# Patient Record
Sex: MALE | Race: WHITE | Employment: FULL TIME | URBAN - METROPOLITAN AREA
[De-identification: names, ages, dates, MRNs, and addresses within clinical notes are randomized per-mention and may not be internally consistent; named-entity substitution may affect disease eponyms.]

---

## 2019-09-03 ENCOUNTER — HOSPITAL ENCOUNTER (OUTPATIENT)
Dept: CT IMAGING | Age: 54
Discharge: HOME | End: 2019-09-03
Payer: SELF-PAY

## 2019-09-03 DIAGNOSIS — Z00.00 PREVENTATIVE HEALTH CARE: ICD-10-CM

## 2019-09-03 PROCEDURE — 75571 CT HRT W/O DYE W/CA TEST: CPT

## 2019-09-05 NOTE — CARDIO/PULMONARY
Cardiopulmonary Rehab: I reached this patient by phone and shared his coronary calcium CT score of \"40 \" with him. Education given regarding coronary artery disease and its effects on the cardiovascular system were reviewed. Patient states that he does have a family history of coronary artery disease, that he does not have diabetes, and  states he is not a nicotine user. Patient reports he is not currently requiring cholesterol medication or blood pressure medication. Patient reports being of normal weight, reports making heart healthy diet choices on most days and is regularly physically active. Patient does feel that stress is a risk factor for him. We discussed the potential daily stress management benefits of regular physical activity, more chi, yoga and deep breathing throughout the day. Patient to follow up with primary care physician. Patient requests a copy of this report be mailed to his home address for him to share with his PCP. Understanding verbalized and no further questions at this time.

## 2019-11-08 ENCOUNTER — DOCUMENTATION ONLY (OUTPATIENT)
Dept: CARDIOLOGY CLINIC | Age: 54
End: 2019-11-08

## 2019-11-08 NOTE — PROGRESS NOTES
Left message for patient to call back with his PCP's name so that I can request records for his appointment on Nov.12 with Dr Chet Santoro.

## 2019-11-12 ENCOUNTER — OFFICE VISIT (OUTPATIENT)
Dept: CARDIOLOGY CLINIC | Age: 54
End: 2019-11-12

## 2019-11-12 VITALS
HEIGHT: 68 IN | HEART RATE: 83 BPM | SYSTOLIC BLOOD PRESSURE: 126 MMHG | BODY MASS INDEX: 27.58 KG/M2 | OXYGEN SATURATION: 94 % | DIASTOLIC BLOOD PRESSURE: 80 MMHG | WEIGHT: 182 LBS

## 2019-11-12 DIAGNOSIS — R00.2 PALPITATIONS: ICD-10-CM

## 2019-11-12 DIAGNOSIS — I25.84 CORONARY ARTERY CALCIFICATION: ICD-10-CM

## 2019-11-12 DIAGNOSIS — I25.10 CORONARY ARTERY CALCIFICATION: ICD-10-CM

## 2019-11-12 DIAGNOSIS — R00.0 RAPID HEART BEAT: Primary | ICD-10-CM

## 2019-11-12 NOTE — LETTER
11/13/19 Patient: Braxton Fraser YOB: 1965 Date of Visit: 11/12/2019 Padmini Humphrey MD 
[address] [address] VIA Facsimile: [phone] Dear Padmini Humphrey MD, Thank you for referring Mr. Braxton Fraser to [address] [place] [address] for evaluation. My notes for this consultation are attached. If you have questions, please do not hesitate to call me. I look forward to following your patient along with you.  
 
 
Sincerely, 
 
Tony Echols, DO

## 2019-11-12 NOTE — PROGRESS NOTES
Eder Conte is a 53 y.o. male    Chief Complaint   Patient presents with    New Patient     rapid heart beat    Other     lightheadedness     Patient states palpitations in June and again a few weeks ago    Chest pain No    SOB No    Dizziness No    Swelling No    Refills No    Visit Vitals  /80 (BP 1 Location: Left arm, BP Patient Position: Sitting)   Pulse 83   Ht 5' 8\" (1.727 m)   Wt 182 lb (82.6 kg)   SpO2 94%   BMI 27.67 kg/m²       1. Have you been to the ER, urgent care clinic since your last visit? Hospitalized since your last visit? No    2. Have you seen or consulted any other health care providers outside of the [address] [place] [address] since your last visit? Include any pap smears or colon screening.  No

## 2019-11-13 NOTE — PROGRESS NOTES
[facility] [address], [address] [facility], [address] [place] [address] N[place]    Office [phone],O [phone]           Sonal Alvarez is a 53 y.o. male presents for evaluation of palpitations      Assessment/Recommendations:      ICD-10-CM ICD-9-CM    1. Rapid heart beat R00.0 785.0 AMB POC EKG ROUTINE W/ 12 LEADS, INTER & REP      ECHO ADULT COMPLETE   2. Palpitations R00.2 785.1    3. Coronary artery calcification I25.10 414.00     I25.84 414.4      CAC score of 40, LAD  PSVT- ? AVNRT by hx, very infrequent episodes. One to twice yearly    - echocardiogram  - consider ambulatory heart monitoring if symptoms persist  - obtain lipids from PCP  - needs TSH, if not done on recent physical  - instructed patient to seek urgent medical attention for palpitations to seek diagnosis      Primary Care Physician- Adore Chowdary MD    Follow-up one year    Subjective:  53 y.o. presents for evaluation of palpitations. He reports having two separate episodes of palpitations previously. Once last year and an episode recently. [facility] but symptoms have resolved. Palpitations lasted several hours and resolved with vagal maneuvers. No chest pain, SOB with symptoms. Very active without any angina symptoms. Previously had CT heart score that was 40, involving LAD territory. [facility]      History reviewed. No pertinent past medical history. History reviewed. No pertinent surgical history.       Current Outpatient Medications:     cannabidiol, CBD, extract 100 mg/mL soln, Take  by mouth., Disp: , Rfl:     Allergies no known allergies     Family History   Problem Relation Age of Onset    Arrhythmia Mother     Anxiety Sister        Social History     Tobacco Use    Smoking status: Never Smoker    Smokeless tobacco: Never Used   Substance Use Topics    Alcohol use: Not Currently     Frequency: Never    Drug use: Not on file       Review of Symptoms:  Pertinent Positive:  negative  Pertinent Negative:  No chest pain, dyspnea on exertion, shortness of breath, orthopnea, PND    All Other systems reviewed and are negative for a Comprehensive ROS (10+)    Physical Exam    Blood pressure 126/80, pulse 83, height 5' 8\" (1.727 m), weight 182 lb (82.6 kg), SpO2 94 %. Constitutional:  well-developed and well-nourished. No distress. HENT: Normocephalic. Eyes: No scleral icterus. Neck:  Neck supple. No JVD present. Pulmonary/Chest: Effort normal and breath sounds normal. No respiratory distress, wheezes or rales. Cardiovascular: Normal rate, regular rhythm, S1 S2 . Exam reveals no gallop and no friction rub. No murmur heard. No edema. Extremities:  Normal muscle tone  Abdominal:   No abnormal distension. Neurological:  Moving all extremities, cranial nerves appear grossly intact. Skin: Skin is not cold. Not diaphoretic. No erythema. Psychiatric:  Grossly normal mood and affect. Intact insight. Objective Data:    EC2019- NSR, normal ecg          CT Results (most recent):  Results from Hospital Encounter encounter on 19   CT HEART W/O CONT WITH CALCIUM    Narrative EXAM: CT HEART W/O CONT WITH CALCIUM  Clinical history: Screening exam  INDICATION: preventative health care. Encounter for general adult medical  examination without abnormal findings    COMPARISON: None. TECHNIQUE: Unenhanced multislice helical CT of the heart was performed on a  64-slice multiple detector row CT system ([email or web address]). Quantitative coronary artery  CT calcium scoring was performed using [email or web address] software. No intravenous contrast was  administered. CT dose reduction was achieved through use of a standardized  protocol tailored for this examination and automatic exposure control for dose  modulation.      FINDINGS:  The coronary calcium in each vessel is as follows:    Left main coronary artery: 0  Left anterior descending coronary artery: 40  Left circumflex coronary artery: 0  Right coronary artery: 0  Posterior descending coronary artery: 0    Total calcium score: 40     Calcium score interpretation:  0-0 = No evidence of CAD  1-10 = Minimal evidence of CAD   = Mild evidence of CAD  101-400 = Moderate evidence of CAD  >400 = Extensive evidence of CAD    A score of 40 is at the 60th percentile rank. That means that 40% of the Male at  the ages from 46-50 will have a higher calcium score. Chest CT findings:  The visualized lungs are clear of mass, nodule, airspace  disease or edema. The entire lung fields are not imaged on this examination. No  mediastinal mass or adenopathy is identified. The visualized portions of the  upper abdominal organs are normal on this unenhanced examination. The bones and  soft tissues are within normal limits. Impression IMPRESSION: Total calcium score of 40. Mild evidence of coronary artery disease. The result should be discussed with the patient taking into account other risk  factors such as age, gender, family history, diabetes, smoking or high  cholesterol levels.              Latasha Simpson DO

## 2019-11-27 ENCOUNTER — TELEPHONE (OUTPATIENT)
Dept: CARDIOLOGY CLINIC | Age: 54
End: 2019-11-27

## 2019-12-04 NOTE — TELEPHONE ENCOUNTER
Called patient ID verified [id number] reviewed below results per Dr Keyanna Piper. Please let patient know that their echocardiogram is normal.     Patient verbalized understanding.

## 2021-01-20 ENCOUNTER — OFFICE VISIT (OUTPATIENT)
Dept: CARDIOLOGY CLINIC | Age: 56
End: 2021-01-20
Payer: COMMERCIAL

## 2021-01-20 VITALS
HEIGHT: 68 IN | BODY MASS INDEX: 29.95 KG/M2 | WEIGHT: 197.6 LBS | SYSTOLIC BLOOD PRESSURE: 120 MMHG | HEART RATE: 88 BPM | DIASTOLIC BLOOD PRESSURE: 78 MMHG

## 2021-01-20 DIAGNOSIS — R93.1 AGATSTON CORONARY ARTERY CALCIUM SCORE LESS THAN 100: ICD-10-CM

## 2021-01-20 DIAGNOSIS — Z13.220 LIPID SCREENING: ICD-10-CM

## 2021-01-20 DIAGNOSIS — R00.2 PALPITATIONS: Primary | ICD-10-CM

## 2021-01-20 PROCEDURE — 99213 OFFICE O/P EST LOW 20 MIN: CPT | Performed by: NURSE PRACTITIONER

## 2021-01-20 PROCEDURE — 93000 ELECTROCARDIOGRAM COMPLETE: CPT | Performed by: NURSE PRACTITIONER

## 2021-01-20 RX ORDER — BUSPIRONE HYDROCHLORIDE 15 MG/1
15 TABLET ORAL DAILY
COMMUNITY
Start: 2021-01-15 | End: 2021-04-21 | Stop reason: ALTCHOICE

## 2021-01-20 NOTE — LETTER
1/26/2021 Patient: Riley Bernal YOB: 1965 Date of Visit: 1/20/2021 Alfonzo Wilkes MD 
[address] [address] Via Fax: [phone] Dear Alfonzo Wilkes MD, Thank you for referring Mr. Miki Ingram to [address]  [facility] for evaluation. My notes for this consultation are attached. If you have questions, please do not hesitate to call me. I look forward to following your patient along with you.  
 
 
Sincerely, 
 
Dorian Callaway, NP

## 2021-01-20 NOTE — PROGRESS NOTES
Katie Piña, KERRIE  [address] [place]  [place], [address] [facility]    Office [phone]  Fax [phone]             Santhosh Livingston is a 63 y.o. male presents for evaluation of palpitations    Assessment/Recommendations:      ICD-10-CM ICD-9-CM    1. Palpitations  R00.2 785.1 AMB POC EKG ROUTINE W/ 12 LEADS, INTER & REP      CBC W/O DIFF      METABOLIC PANEL, COMPREHENSIVE      TSH 3RD GENERATION   2. Lipid screening  Z13.220 V77.91 LIPID PANEL   3. Agatston coronary artery calcium score less than 100  R93.1 793.2      Palpitations / tachycardia- infrequent episodes occurring every other month. Recommend alivecor for hm monitoring  - echocardiogram 11/2019 - nml LVEF, No WMAs, mildly dilated LA  - obtain labs - CBC, CMP, and TSH    CAC score of 40, LAD  - not on aspirin - consider starting 81mg/d next visit - discussed briefly today - pt does not like taking meds    - check lipids   - BP normotensive (not on meds) - pt is regularly active without issue    Primary Care Physician- Cleveland Choi MD    Follow-up in 3mo or PRN to review heart rhythms    Subjective:  63 y.o. presents for fu of of palpitations. He reports recurrent episodes recently which occur every other month. Was asymptotic x9-10mo, then in July symptoms started again. In July noted 6-7hr of irregular heart beat and increased HR ranging from 80s-120s. States episodes continue to occur every other month or so. Has hx of anxiety and was recently rx Buspar by PCP. Pt wondered if this was the issue. However, on last episode pt awoke from sleep with palpitations. Was sleeping comfortably and without issue. Doesn't recall nightmare during sleep or stress/anxiety once waking up. Patient denies any chest pain, dyspnea, syncope, orthopnea, edema, or paroxysmal nocturnal dyspnea. Exercises regularly. Previously had CT heart score that was 40, involving LAD territory. Dx with COVID-19 May of 2020. No past medical history on file. No past surgical history on file. Current Outpatient Medications:     busPIRone (BUSPAR) 15 mg tablet, Take 15 mg by mouth daily. , Disp: , Rfl:     No Known Allergies     Family History   Problem Relation Age of Onset    Arrhythmia Mother     Anxiety Sister        Social History     Tobacco Use    Smoking status: Never Smoker    Smokeless tobacco: Never Used   Substance Use Topics    Alcohol use: Not Currently     Frequency: Never    Drug use: Not on file       Review of Symptoms:  (Positive findings above)  Constitutional: Negative for fever, chills, and diaphoresis. Respiratory: Negative for cough, hemoptysis, sputum production, shortness of breath and wheezing. Cardiovascular: Negative for chest pain, orthopnea, leg swelling and PND. Gastrointestinal: Negative for heartburn, nausea, vomiting, blood in stool and melena. Genitourinary: Negative for dysuria and flank pain. Neurological: Negative for focal weakness, seizures, loss of consciousness  Endo/Heme/Allergies: Negative for abnormal bleeding. Psychiatric/Behavioral: Negative for memory loss. Physical Exam    Blood pressure 120/78, pulse 88, height 5' 8\" (1.727 m), weight 197 lb 9.6 oz (89.6 kg). General - well developed well nourished  Neck - JVP normal,   Cardiac - normal S1, S2, no murmurs, rubs or gallops.  No clicks  Vascular - carotids without bruits, radials and pedal pulses equal bilateral  Lungs - clear to auscultation bilaterals, no rales, wheezing or rhonchi  Abd - soft nontender, non-distended, +BS  Extremities - no edema, warm, well perfused  Neuro - nonfocal  Psych - normal mood and affect      Objective Data:    ECG 1/20/21 - NSR    11/12/2019- NSR, normal ecg    CT Results (most recent):  Results from [place] encounter on 09/03/19   CT HEART W/O CONT WITH CALCIUM    Narrative EXAM: CT HEART W/O CONT WITH CALCIUM  Clinical history: Screening exam  INDICATION: preventative health care. Encounter for general adult medical  examination without abnormal findings    COMPARISON: None. TECHNIQUE: Unenhanced multislice helical CT of the heart was performed on a  64-slice multiple detector row CT system (eÃ“ticaT). Quantitative coronary artery  CT calcium scoring was performed using Task Messenger software. No intravenous contrast was  administered. CT dose reduction was achieved through use of a standardized  protocol tailored for this examination and automatic exposure control for dose  modulation. FINDINGS:  The coronary calcium in each vessel is as follows:    Left main coronary artery: 0  Left anterior descending coronary artery: 40  Left circumflex coronary artery: 0  Right coronary artery: 0  Posterior descending coronary artery: 0    Total calcium score: 40     Calcium score interpretation:  0-0 = No evidence of CAD  1-10 = Minimal evidence of CAD   = Mild evidence of CAD  101-400 = Moderate evidence of CAD  >400 = Extensive evidence of CAD    A score of 40 is at the 60th percentile rank. That means that 40% of the Male at  the ages from 46-50 will have a higher calcium score. Chest CT findings:  The visualized lungs are clear of mass, nodule, airspace  disease or edema. The entire lung fields are not imaged on this examination. No  mediastinal mass or adenopathy is identified. The visualized portions of the  upper abdominal organs are normal on this unenhanced examination. The bones and  soft tissues are within normal limits. Impression IMPRESSION: Total calcium score of 40. Mild evidence of coronary artery disease. The result should be discussed with the patient taking into account other risk  factors such as age, gender, family history, diabetes, smoking or high  cholesterol levels.        Javad Penny, ANP

## 2021-01-20 NOTE — PATIENT INSTRUCTIONS
Get blood work after our visit I recommend you buy alivecor for monitoring of your heart rhythm - we will see you back in 3months for follow-up. Https://[employer]. [employer]/products/stalin

## 2021-03-22 ENCOUNTER — OFFICE VISIT (OUTPATIENT)
Dept: CARDIOLOGY CLINIC | Age: 56
End: 2021-03-22
Payer: COMMERCIAL

## 2021-03-22 VITALS
DIASTOLIC BLOOD PRESSURE: 68 MMHG | BODY MASS INDEX: 30.43 KG/M2 | SYSTOLIC BLOOD PRESSURE: 98 MMHG | HEIGHT: 68 IN | RESPIRATION RATE: 18 BRPM | OXYGEN SATURATION: 97 % | WEIGHT: 200.8 LBS | HEART RATE: 85 BPM

## 2021-03-22 DIAGNOSIS — R00.2 PALPITATIONS: ICD-10-CM

## 2021-03-22 DIAGNOSIS — R93.1 AGATSTON CORONARY ARTERY CALCIUM SCORE LESS THAN 100: ICD-10-CM

## 2021-03-22 DIAGNOSIS — R00.0 TACHYCARDIA: Primary | ICD-10-CM

## 2021-03-22 PROCEDURE — 99214 OFFICE O/P EST MOD 30 MIN: CPT | Performed by: NURSE PRACTITIONER

## 2021-03-22 RX ORDER — DILTIAZEM HYDROCHLORIDE 30 MG/1
30 TABLET, FILM COATED ORAL AS NEEDED
Qty: 60 TAB | Refills: 0 | Status: SHIPPED | OUTPATIENT
Start: 2021-03-22

## 2021-03-22 NOTE — PATIENT INSTRUCTIONS
Lets arrange for 30d loop monitor as well as echo at follow-up. See Dr. Whit Osullivan. in about 6 weeks with echo same day. Start aspirin 81mg per day.

## 2021-03-22 NOTE — PROGRESS NOTES
Room #: 9    Still having episode of rapid heartbeats/palpitations and dizziness. Visit Vitals  BP 98/68 (BP 1 Location: Left upper arm, BP Patient Position: Sitting, BP Cuff Size: Large adult)   Pulse 85   Resp 18   Ht 5' 8\" (1.727 m)   Wt 200 lb 12.8 oz (91.1 kg)   SpO2 97%   BMI 30.53 kg/m²         Chest pain:  NO  Shortness of breath:  NO  Edema: NO  Palpitations, skipped beats, rapid heartbeat:  YES  Dizziness:  YES    1. Have you been to the ER, urgent care clinic since your last visit? Hospitalized since your last visit? No    2. Have you seen or consulted any other health care providers outside of the [address] [place] [address] since your last visit? Include any pap smears or colon screening.  No      Refills:  NO

## 2021-03-22 NOTE — LETTER
4/5/2021 Patient: Annie Fay YOB: 1965 Date of Visit: 3/22/2021 David Baca MD 
[address] [address] Via Fax: [phone] Dear David Baca MD, Thank you for referring Mr. Dante Daniel to [address] [facility] for evaluation. My notes for this consultation are attached. If you have questions, please do not hesitate to call me. I look forward to following your patient along with you.  
 
 
Sincerely, 
 
Lalitha Guzmán NP

## 2021-03-22 NOTE — PROGRESS NOTES
Olaf Eddy, KERRIE  [address] [place]  [place], [address] [facility]    Office [phone]  Fax [phone]             Alicja Alexander is a 63 y.o. male presents for f/u of tachycardia/ palpitations. Assessment/Recommendations:      ICD-10-CM ICD-9-CM    1. Tachycardia  R00.0 785.0    2. Palpitations  R00.2 785.1    3. Agatston coronary artery calcium score less than 100  R93.1 793.2       Palpitations / tachycardia- concern for parox afib  - check 30d loop monitor as well as updated echo  - Take diltiazem IR 30mg as needed for HR sustained >100 at rest for >30min  - echocardiogram 11/2019 - nml LVEF, No WMAs, mildly dilated LA  - TSH nml 1/20/21  - [id number] Score 0     CAC score of 40, LAD  - start aspirin 81mg/d   - LDL mildly elevated at 113 - try lifestyle changes for now. ASCVD Risk 4.9%. Re-eval in 6mo; pt prefers to avoid multiple meds if able  - BP normotensive (not on meds)  - pt is regularly active without issue    Primary Care Physician- Antwon Dale MD    Follow-up in 6wks or PRN    Subjective:  63 y.o. presents for fu of of palpitations. He reports recurrent episodes with concern of parox afib/afib with RVR per alivecor monitor. Reviewed by Dr. Krystin Jack in office. Patient denies any chest pain, dyspnea, syncope, orthopnea, edema, or paroxysmal nocturnal dyspnea. Exercises regularly. Previously had CT heart score that was 40, involving LAD territory. No past medical history on file. No past surgical history on file. Current Outpatient Medications:     dilTIAZem IR (CARDIZEM) 30 mg tablet, Take 1 Tab by mouth as needed (take as need for HR sustained >100 at rest for >30min. )., Disp: 60 Tab, Rfl: 0    busPIRone (BUSPAR) 15 mg tablet, Take 15 mg by mouth daily. , Disp: , Rfl:     No Known Allergies     Family History   Problem Relation Age of Onset    Arrhythmia Mother     Anxiety Sister        Social History     Tobacco Use    Smoking status: Never Smoker    Smokeless tobacco: Never Used   Substance Use Topics    Alcohol use: Not Currently     Frequency: Never    Drug use: Not on file       Review of Symptoms:  (Positive findings above)  Constitutional: Negative for fever, chills, and diaphoresis. Respiratory: Negative for cough, hemoptysis, sputum production, shortness of breath and wheezing. Cardiovascular: Negative for chest pain, orthopnea, leg swelling and PND. Gastrointestinal: Negative for heartburn, nausea, vomiting, blood in stool and melena. Genitourinary: Negative for dysuria and flank pain. Neurological: Negative for focal weakness, seizures, loss of consciousness  Endo/Heme/Allergies: Negative for abnormal bleeding. Psychiatric/Behavioral: Negative for memory loss. Physical Exam    Blood pressure 98/68, pulse 85, resp. rate 18, height 5' 8\" (1.727 m), weight 200 lb 12.8 oz (91.1 kg), SpO2 97 %. BP Readings from Last 3 Encounters:   03/22/21 98/68   01/20/21 120/78   11/20/19 112/68     General - well developed well nourished  Neck - JVP normal,   Cardiac - normal S1, S2, no murmurs, rubs or gallops. No clicks  Vascular - radials and pedal pulses equal bilateral  Lungs - clear to auscultation bilaterals, no rales, wheezing or rhonchi  Abd - soft nontender, non-distended, +BS  Extremities - no edema, warm, well perfused  Neuro - nonfocal  Psych - normal mood and affect      Objective Data:    ECG 1/20/21 - NSR    11/12/2019- NSR, normal ecg    CT Results (most recent):  Results from [place] encounter on 09/03/19   CT HEART W/O CONT WITH CALCIUM    Narrative EXAM: CT HEART W/O CONT WITH CALCIUM  Clinical history: Screening exam  INDICATION: preventative health care. Encounter for general adult medical  examination without abnormal findings    COMPARISON: None. TECHNIQUE: Unenhanced multislice helical CT of the heart was performed on a  64-slice multiple detector row CT system (Quikey VCT).  Quantitative coronary artery  CT calcium scoring was performed using Picanova software. No intravenous contrast was  administered. CT dose reduction was achieved through use of a standardized  protocol tailored for this examination and automatic exposure control for dose  modulation. FINDINGS:  The coronary calcium in each vessel is as follows:    Left main coronary artery: 0  Left anterior descending coronary artery: 40  Left circumflex coronary artery: 0  Right coronary artery: 0  Posterior descending coronary artery: 0    Total calcium score: 40     Calcium score interpretation:  0-0 = No evidence of CAD  1-10 = Minimal evidence of CAD   = Mild evidence of CAD  101-400 = Moderate evidence of CAD  >400 = Extensive evidence of CAD    A score of 40 is at the 60th percentile rank. That means that 40% of the Male at  the ages from 46-50 will have a higher calcium score. Chest CT findings:  The visualized lungs are clear of mass, nodule, airspace  disease or edema. The entire lung fields are not imaged on this examination. No  mediastinal mass or adenopathy is identified. The visualized portions of the  upper abdominal organs are normal on this unenhanced examination. The bones and  soft tissues are within normal limits. Impression IMPRESSION: Total calcium score of 40. Mild evidence of coronary artery disease. The result should be discussed with the patient taking into account other risk  factors such as age, gender, family history, diabetes, smoking or high  cholesterol levels.        Pako Hudson, ANP

## 2021-04-21 ENCOUNTER — OFFICE VISIT (OUTPATIENT)
Dept: CARDIOLOGY CLINIC | Age: 56
End: 2021-04-21
Payer: COMMERCIAL

## 2021-04-21 ENCOUNTER — ANCILLARY PROCEDURE (OUTPATIENT)
Dept: CARDIOLOGY CLINIC | Age: 56
End: 2021-04-21

## 2021-04-21 VITALS
DIASTOLIC BLOOD PRESSURE: 68 MMHG | WEIGHT: 199.4 LBS | OXYGEN SATURATION: 97 % | HEIGHT: 68 IN | BODY MASS INDEX: 30.22 KG/M2 | HEART RATE: 90 BPM | SYSTOLIC BLOOD PRESSURE: 120 MMHG

## 2021-04-21 VITALS
WEIGHT: 200 LBS | SYSTOLIC BLOOD PRESSURE: 120 MMHG | HEIGHT: 68 IN | BODY MASS INDEX: 30.31 KG/M2 | DIASTOLIC BLOOD PRESSURE: 68 MMHG

## 2021-04-21 DIAGNOSIS — R00.2 PALPITATIONS: ICD-10-CM

## 2021-04-21 DIAGNOSIS — R93.1 AGATSTON CORONARY ARTERY CALCIUM SCORE LESS THAN 100: ICD-10-CM

## 2021-04-21 DIAGNOSIS — I47.29 NSVT (NONSUSTAINED VENTRICULAR TACHYCARDIA): Primary | ICD-10-CM

## 2021-04-21 DIAGNOSIS — R00.0 TACHYCARDIA: ICD-10-CM

## 2021-04-21 LAB
ECHO AO ASC DIAM: 3.16 CM
ECHO AO ROOT DIAM: 3.36 CM
ECHO AV AREA PEAK VELOCITY: 3.13 CM2
ECHO AV AREA VTI: 2.68 CM2
ECHO AV AREA/BSA PEAK VELOCITY: 1.5 CM2/M2
ECHO AV AREA/BSA VTI: 1.3 CM2/M2
ECHO AV MEAN GRADIENT: 3.43 MMHG
ECHO AV PEAK GRADIENT: 7.03 MMHG
ECHO AV PEAK VELOCITY: 132.57 CM/S
ECHO AV VTI: 23.8 CM
ECHO LA AREA 4C: 22.34 CM2
ECHO LA MAJOR AXIS: 3.49 CM
ECHO LA MINOR AXIS: 1.71 CM
ECHO LA VOL 2C: 69.48 ML (ref 18–58)
ECHO LA VOL 4C: 66.75 ML (ref 18–58)
ECHO LA VOL BP: 73.65 ML (ref 18–58)
ECHO LA VOL/BSA BIPLANE: 36.08 ML/M2 (ref 16–28)
ECHO LA VOLUME INDEX A2C: 34.04 ML/M2 (ref 16–28)
ECHO LA VOLUME INDEX A4C: 32.7 ML/M2 (ref 16–28)
ECHO LV E' LATERAL VELOCITY: 12.62 CM/S
ECHO LV E' SEPTAL VELOCITY: 9.5 CM/S
ECHO LV INTERNAL DIMENSION DIASTOLIC: 5.02 CM (ref 4.2–5.9)
ECHO LV INTERNAL DIMENSION SYSTOLIC: 3.27 CM
ECHO LV IVSD: 0.82 CM (ref 0.6–1)
ECHO LV MASS 2D: 135.6 G (ref 88–224)
ECHO LV MASS INDEX 2D: 66.4 G/M2 (ref 49–115)
ECHO LV POSTERIOR WALL DIASTOLIC: 0.77 CM (ref 0.6–1)
ECHO LVOT DIAM: 2.11 CM
ECHO LVOT PEAK GRADIENT: 5.61 MMHG
ECHO LVOT PEAK VELOCITY: 118.39 CM/S
ECHO LVOT SV: 63.9 ML
ECHO LVOT VTI: 18.23 CM
ECHO MV A VELOCITY: 55.16 CM/S
ECHO MV E DECELERATION TIME (DT): 206.39 MS
ECHO MV E VELOCITY: 58.87 CM/S
ECHO MV E/A RATIO: 1.07
ECHO MV E/E' LATERAL: 4.66
ECHO MV E/E' RATIO (AVERAGED): 5.43
ECHO MV E/E' SEPTAL: 6.2
ECHO MV PRESSURE HALF TIME (PHT): 59.85 MS
ECHO RA AREA 4C: 25.17 CM2
ECHO RV INTERNAL DIMENSION: 3.63 CM
ECHO RV TAPSE: 3.08 CM (ref 1.5–2)
LA VOL DISK BP: 68.21 ML (ref 18–58)

## 2021-04-21 PROCEDURE — 99214 OFFICE O/P EST MOD 30 MIN: CPT | Performed by: STUDENT IN AN ORGANIZED HEALTH CARE EDUCATION/TRAINING PROGRAM

## 2021-04-21 PROCEDURE — 93306 TTE W/DOPPLER COMPLETE: CPT | Performed by: STUDENT IN AN ORGANIZED HEALTH CARE EDUCATION/TRAINING PROGRAM

## 2021-04-21 NOTE — PROGRESS NOTES
Leticia Boykin is a 63 y.o. male    Chief Complaint   Patient presents with    Palpitations    Follow-up     3 month f/u, CAC, rapid heart beat, Echo today     Patient still wearing monitor  Had J & J vaccine and has felt brain fog. Chest pain No     SOB No     Dizziness No    Swelling No     Refills No    Visit Vitals  /68 (BP 1 Location: Left upper arm, BP Patient Position: Sitting)   Pulse 90   Ht 5' 8\" (1.727 m)   Wt 199 lb 6.4 oz (90.4 kg)   SpO2 97%   BMI 30.32 kg/m²       1. Have you been to the ER, urgent care clinic since your last visit? Hospitalized since your last visit? No     2. Have you seen or consulted any other health care providers outside of the [address] [place] [address] since your last visit? Include any pap smears or colon screening.   No

## 2021-04-21 NOTE — PROGRESS NOTES
[address] [facility], [address] [place] [address]    Office [phone]  Fax [phone]             Frankey Barrack is a 63 y.o. male presents for f/u of tachycardia/palpitations. Assessment/Recommendations:      ICD-10-CM ICD-9-CM    1. NSVT (nonsustained ventricular tachycardia) ([facility])  I47.2 427.1 ECHO STRESS   2. Palpitations  R00.2 785.1    3. Agatston coronary artery calcium score less than 100  R93.1 793.2         Palpitations / tachycardia- concern for parox afib, event monitor to date has not shown any A. fib or paroxysmal supraventricular tachycardia. Event monitor 4/6/21, 5 beats of NSVT  -Continue 30 Day Loop  - As needed diltiazem IR 30mg  HR sustained >100 at rest for >30min  - TSH nml 1/20/21  - [id number] Score 0     NSVT5 beats on event monitor for palpitations. Recommend to proceed with exercise stress echo    CAC score of 40, LAD. No anginal chest pain and good functional capacity  - aspirin 81mg/d   - LDL mildly elevated at 113 - try lifestyle changes for now. ASCVD Risk 4.9%. Re-eval in 6mo; pt prefers to avoid multiple meds if possible    Anxietylikely contributing to his palpitation symptoms. Long-term can consider adding beta-blocker therapy, pending clinical course      Primary Care Physician- Bill Colby MD    Follow-up in 6 months sooner as needed        Subjective:  63 y.o. presents the office for follow-up evaluation. Recently seen due to increased palpitations. His alivecor showed concern for possible atrial fibrillation. Currently wearing 30-day event monitor. To date he has not shown any A. fib nor SVT. Incidentally shown to have 5 beats of NSVT on April 6 at 7 AM.  Continues to be active without any ongoing exertional chest pain symptoms. Lifts weights frequently without chest pain symptoms. He does report severe anxiety.         Current Outpatient Medications:     dilTIAZem IR (CARDIZEM) 30 mg tablet, Take 1 Tab by mouth as needed (take as need for HR sustained >100 at rest for >30min. )., Disp: 60 Tab, Rfl: 0    No Known Allergies     Family History   Problem Relation Age of Onset    Arrhythmia Mother     Anxiety Sister        Social History     Tobacco Use    Smoking status: Never Smoker    Smokeless tobacco: Never Used   Substance Use Topics    Alcohol use: Not Currently     Frequency: Never    Drug use: Not on file       Review of Symptoms:  (Positive findings above)  Constitutional: Negative for fever, chills, and diaphoresis. Respiratory: Negative for cough, hemoptysis, sputum production, shortness of breath and wheezing. Cardiovascular: Negative for chest pain, orthopnea, leg swelling and PND. Gastrointestinal: Negative for heartburn, nausea, vomiting, blood in stool and melena. Genitourinary: Negative for dysuria and flank pain. Neurological: Negative for focal weakness, seizures, loss of consciousness  Endo/Heme/Allergies: Negative for abnormal bleeding. Psychiatric/Behavioral: Negative for memory loss. Physical Exam    Blood pressure 120/68, pulse 90, height 5' 8\" (1.727 m), weight 199 lb 6.4 oz (90.4 kg), SpO2 97 %. BP Readings from Last 3 Encounters:   04/21/21 120/68   04/21/21 120/68   03/22/21 98/68     General - well developed well nourished  Neck - JVP normal,   Cardiac - normal S1, S2, no murmurs, rubs or gallops.  No clicks  Vascular - radials and pedal pulses equal bilateral  Lungs - clear to auscultation bilaterals, no rales, wheezing or rhonchi  Abd - soft nontender, non-distended, +BS  Extremities - no edema, warm, well perfused  Neuro - nonfocal  Psych - normal mood and affect      Objective Data:    ECG 1/20/21 - NSR    11/12/2019- NSR, normal ecg    CT heart score 9/2019  Left main coronary artery: 0  Left anterior descending coronary artery: 40  Left circumflex coronary artery: 0  Right coronary artery: 0  Posterior descending coronary artery: 0     Total calcium score: 40       Echo 4/21/2021normal LVEF, no significant valvular pathology

## 2021-05-04 ENCOUNTER — APPOINTMENT (OUTPATIENT)
Dept: CARDIOLOGY CLINIC | Age: 56
End: 2021-05-04

## 2021-05-04 ENCOUNTER — ANCILLARY PROCEDURE (OUTPATIENT)
Dept: CARDIOLOGY CLINIC | Age: 56
End: 2021-05-04
Payer: COMMERCIAL

## 2021-05-04 VITALS — BODY MASS INDEX: 30.16 KG/M2 | WEIGHT: 199 LBS | HEIGHT: 68 IN

## 2021-05-04 DIAGNOSIS — I47.29 NSVT (NONSUSTAINED VENTRICULAR TACHYCARDIA): ICD-10-CM

## 2021-05-04 LAB
STRESS ANGINA INDEX: 0
STRESS BASELINE DIAS BP: 70 MMHG
STRESS BASELINE HR: 84 BPM
STRESS BASELINE SYS BP: 128 MMHG
STRESS ESTIMATED WORKLOAD: 13.4 METS
STRESS EXERCISE DUR MIN: NORMAL
STRESS O2 SAT PEAK: 91 %
STRESS O2 SAT REST: 96 %
STRESS PEAK DIAS BP: 86 MMHG
STRESS PEAK SYS BP: 150 MMHG
STRESS PERCENT HR ACHIEVED: 100 %
STRESS POST PEAK HR: 164 BPM
STRESS RATE PRESSURE PRODUCT: NORMAL BPM*MMHG
STRESS ST DEPRESSION: 0 MM
STRESS ST ELEVATION: 0 MM
STRESS TARGET HR: 164 BPM

## 2021-05-04 PROCEDURE — 93351 STRESS TTE COMPLETE: CPT | Performed by: STUDENT IN AN ORGANIZED HEALTH CARE EDUCATION/TRAINING PROGRAM

## 2021-05-07 ENCOUNTER — DOCUMENTATION ONLY (OUTPATIENT)
Dept: CARDIOLOGY CLINIC | Age: 56
End: 2021-05-07

## 2021-10-28 ENCOUNTER — OFFICE VISIT (OUTPATIENT)
Dept: CARDIOLOGY CLINIC | Age: 56
End: 2021-10-28
Payer: COMMERCIAL

## 2021-10-28 VITALS
HEIGHT: 68 IN | WEIGHT: 195 LBS | OXYGEN SATURATION: 97 % | HEART RATE: 72 BPM | SYSTOLIC BLOOD PRESSURE: 132 MMHG | BODY MASS INDEX: 29.55 KG/M2 | DIASTOLIC BLOOD PRESSURE: 80 MMHG

## 2021-10-28 DIAGNOSIS — R00.2 PALPITATIONS: Primary | ICD-10-CM

## 2021-10-28 DIAGNOSIS — I48.0 PAROXYSMAL ATRIAL FIBRILLATION (HCC): ICD-10-CM

## 2021-10-28 PROCEDURE — 99214 OFFICE O/P EST MOD 30 MIN: CPT | Performed by: STUDENT IN AN ORGANIZED HEALTH CARE EDUCATION/TRAINING PROGRAM

## 2021-10-28 NOTE — PROGRESS NOTES
[address] [facility], [address] [place] [address]    Office [phone]  Fax [phone]             Ty Bruno is a 61 y.o. male presents for f/u of tachycardia/palpitations. Assessment/Recommendations:      ICD-10-CM ICD-9-CM    1. Palpitations  R00.2 785.1    2. Paroxysmal atrial fibrillation (HCC)  I48.0 427.31         Paroxysmal supraventricular tachycardia/probable paroxysmal atrial fibrillation, continues to monitor heart rhythm with Kira Chen. Reviewed rhythm strips from symptomatic event 9/21. Appears to be paroxysmal atrial fibrillation.  - Discussed using diltiazem on a daily versus versus instant release on an as-needed basis, patient to continue as needed diltiazem IR 30mg for sustained tachycardia. Consider AF ablation if patient has escalation in AF symptoms  - TSH nml 1/20/21  - [id number] Score 0       CAC score of 40, LAD. No anginal chest pain and good functional capacity  - LDL mildly elevated at 113 - try lifestyle changes for now. ASCVD Risk 4.9%. Anxiety, depression      Primary Care Physician- Duke Ruiz MD    Follow-up in 6 months sooner as needed        Subjective:  61 y.o. presents the office for follow-up evaluation. Presents to the office for follow-up evaluation. History of having remote palpitations. Reports not having any symptomatic episodes for approximately 9 months. Back in September he developed several hours of palpitations. Alivecore cardia pacing shows paroxysmal atrial fibrillation. He also reports having an episode on Tuesday after a stressful day at work. Symptoms usually last for about several hours. Patient takes diltiazem in the calm down. Current Outpatient Medications:     TURMERIC PO, Take 1,500 mcg by mouth daily. , Disp: , Rfl:     dilTIAZem IR (CARDIZEM) 30 mg tablet, Take 1 Tab by mouth as needed (take as need for HR sustained >100 at rest for >30min. )., Disp: 60 Tab, Rfl: 0    No Known Allergies Family History   Problem Relation Age of Onset    Arrhythmia Mother     Anxiety Sister        Social History     Tobacco Use    Smoking status: Never Smoker    Smokeless tobacco: Never Used   Substance Use Topics    Alcohol use: Not Currently    Drug use: Not on file       Review of Symptoms:  (Positive findings above)  Constitutional: Negative for fever, chills, and diaphoresis. Respiratory: Negative for cough, hemoptysis, sputum production, shortness of breath and wheezing. Cardiovascular: Negative for chest pain, orthopnea, leg swelling and PND. Gastrointestinal: Negative for heartburn, nausea, vomiting, blood in stool and melena. Genitourinary: Negative for dysuria and flank pain. Neurological: Negative for focal weakness, seizures, loss of consciousness  Endo/Heme/Allergies: Negative for abnormal bleeding. Psychiatric/Behavioral: Negative for memory loss. Physical Exam    Blood pressure 132/80, pulse 72, height 5' 8\" (1.727 m), weight 195 lb (88.5 kg), SpO2 97 %. BP Readings from Last 3 Encounters:   10/28/21 132/80   04/21/21 120/68   04/21/21 120/68     General - well developed well nourished  Neck - JVP normal,   Cardiac - normal S1, S2, no murmurs, rubs or gallops.  No clicks  Vascular - radials and pedal pulses equal bilateral  Lungs - clear to auscultation bilaterals, no rales, wheezing or rhonchi  Abd - soft nontender, non-distended, +BS  Extremities - no edema, warm, well perfused  Neuro - nonfocal  Psych - normal mood and affect      Objective Data:    ECG 1/20/21 - NSR    11/12/2019- NSR, normal ecg    CT heart score 9/2019  Left main coronary artery: 0  Left anterior descending coronary artery: 40  Left circumflex coronary artery: 0  Right coronary artery: 0  Posterior descending coronary artery: 0     Total calcium score: 40       Echo 4/21/2021normal LVEF, no significant valvular pathology      Event monitor- 3/31-4/29/21- 5 beat Vtach 4/6/21 at 0700.    05/04/21    ECHO STRESS 05/04/2021 5/25/2021    Interpretation Summary  · Baseline ECG: Normal sinus rhythm. · Stress test: Negative stress test. Low risk Duke treadmill score. · Echo: Negative stress echocardiogram for evidence of ischemia. Low risk duke treadmill score. No ecg changes at good functional capacity. Sensitivity of stress echocardiographic images is limited due to HR <85% of peak on post-exercise images.     Signed by: Earnest Salguero DO on 5/4/2021  4:59 PM

## 2021-10-28 NOTE — PROGRESS NOTES
Abelardo Zavala is a 61 y.o. male    Chief Complaint   Patient presents with    Follow-up     6 month f/u NSVT    Palpitations     Chest pain had a few slight twinges     SOB No     Dizziness No    Swelling No    Refills No    Visit Vitals  /80 (BP 1 Location: Left upper arm, BP Patient Position: Sitting)   Pulse 72   Ht 5' 8\" (1.727 m)   Wt 195 lb (88.5 kg)   SpO2 97%   BMI 29.65 kg/m²       1. Have you been to the ER, urgent care clinic since your last visit? Hospitalized since your last visit? No    2. Have you seen or consulted any other health care providers outside of the [address] [place] [address] since your last visit? Include any pap smears or colon screening.  [facility] for right shoulder & Dermatologist in august

## 2022-04-19 ENCOUNTER — TELEPHONE (OUTPATIENT)
Dept: CARDIOLOGY CLINIC | Age: 57
End: 2022-04-19

## 2022-04-19 NOTE — TELEPHONE ENCOUNTER
Called patient to reschedule upcoming appointment on 5/10. Patient stated that he was advised that he could follow up as needed and that he has been doing well and will call and follow up if he starts to have any complications.

## 2023-02-10 ENCOUNTER — TELEPHONE (OUTPATIENT)
Dept: CARDIOLOGY CLINIC | Age: 58
End: 2023-02-10

## 2023-02-10 NOTE — TELEPHONE ENCOUNTER
Patient is calling because last night he was in Afib and he took the diltiazem IR 30 mg at about 8 pm.Heart rate was at 144. Patient said he took the medicine again 2 hours later. Patient also said he had this happen Monday also. Patient is still experiencing Afib this morning. His current heart now is 79. Patient is wondering if his medication may need to be increased.     [phone]

## 2023-02-16 ENCOUNTER — OFFICE VISIT (OUTPATIENT)
Dept: CARDIOLOGY CLINIC | Age: 58
End: 2023-02-16
Payer: COMMERCIAL

## 2023-02-16 VITALS
BODY MASS INDEX: 29.55 KG/M2 | HEIGHT: 68 IN | DIASTOLIC BLOOD PRESSURE: 78 MMHG | SYSTOLIC BLOOD PRESSURE: 120 MMHG | HEART RATE: 89 BPM | WEIGHT: 195 LBS | OXYGEN SATURATION: 99 %

## 2023-02-16 DIAGNOSIS — I48.0 PAROXYSMAL ATRIAL FIBRILLATION (HCC): Primary | ICD-10-CM

## 2023-02-16 RX ORDER — DILTIAZEM HYDROCHLORIDE 120 MG/1
120 CAPSULE, COATED, EXTENDED RELEASE ORAL DAILY
Qty: 90 CAPSULE | Refills: 1 | Status: SHIPPED | OUTPATIENT
Start: 2023-02-16

## 2023-02-16 NOTE — PROGRESS NOTES
Chief Complaint   Patient presents with    Follow-up     Annual      Vitals:    02/16/23 1325   BP: 120/78   BP 1 Location: Left upper arm   BP Patient Position: Sitting   Pulse: 89   Height: 5' 8\" (1.727 m)   Weight: 195 lb (88.5 kg)   SpO2: 99%       Chest pain denied     SOB - some    Palpitations - yes     Swelling in hands/feet denied     Dizziness denied     Recent hospital stays denied     Refills denied

## 2023-02-16 NOTE — PROGRESS NOTES
[address] [facility], [address] [place] [address]    Office [phone]  Fax [phone]             Jeannie Crowell is a 61 y.o. male presents for f/u of tachycardia/palpitations. Assessment/Recommendations:      ICD-10-CM ICD-9-CM    1. Paroxysmal atrial fibrillation (HCC)  I48.0 427.31 AMB POC EKG ROUTINE W/ 12 LEADS, INTER & REP           paroxysmal atrial fibrillation, continues to monitor heart rhythm with Aba Jetty. Recent covid-19 dx with several hours of afib after decongestant.  ~1 episode of afib per month that lasts for a few hours  - not interested in ablation at this time  - change to long acting diltiazem 120mg/d  - [id number] Score 0       CAC score of 40, LAD. No anginal chest pain and good functional capacity  - LDL mildly elevated at 113 - try lifestyle changes for now. ASCVD Risk 4.9%. Anxiety, depression      Primary Care Physician- Kaelyn Martinez MD    Follow-up 3 months        Subjective:  61 y.o. presents the office for follow-up evaluation. Recent covid-19 diagnosis. Developed paroxysmal atrial fibrillation after using the decongestant. Lasted several hours. Reports she has ongoing paroxysmal episodes of A-fib approximately once per month. Using last several hours. Patient states that he researched ablation and would not like to pursue at this time. Questioning if he should use a daily dose of calcium channel blocker. Current Outpatient Medications:     TURMERIC PO, Take 1,500 mcg by mouth daily. , Disp: , Rfl:     dilTIAZem IR (CARDIZEM) 30 mg tablet, Take 1 Tab by mouth as needed (take as need for HR sustained >100 at rest for >30min. )., Disp: 60 Tab, Rfl: 0    No Known Allergies     Family History   Problem Relation Age of Onset    Arrhythmia Mother     Anxiety Sister        Social History     Tobacco Use    Smoking status: Never    Smokeless tobacco: Never   Substance Use Topics    Alcohol use: Not Currently       Review of Symptoms:  (Positive findings above)  Constitutional: Negative for fever, chills, and diaphoresis. Respiratory: Negative for cough, hemoptysis, sputum production, shortness of breath and wheezing. Cardiovascular: Negative for chest pain, orthopnea, leg swelling and PND. Gastrointestinal: Negative for heartburn, nausea, vomiting, blood in stool and melena. Genitourinary: Negative for dysuria and flank pain. Neurological: Negative for focal weakness, seizures, loss of consciousness  Endo/Heme/Allergies: Negative for abnormal bleeding. Psychiatric/Behavioral: Negative for memory loss. Physical Exam    Blood pressure 120/78, pulse 89, height 5' 8\" (1.727 m), weight 195 lb (88.5 kg), SpO2 99 %. BP Readings from Last 3 Encounters:   02/16/23 120/78   10/28/21 132/80   04/21/21 120/68     General - well developed well nourished  Neck - JVP normal,   Cardiac - normal S1, S2, no murmurs, rubs or gallops. No clicks  Vascular - radials and pedal pulses equal bilateral  Lungs - clear to auscultation bilaterals, no rales, wheezing or rhonchi  Abd - soft nontender, non-distended, +BS  Extremities - no edema, warm, well perfused  Neuro - nonfocal  Psych - normal mood and affect      Objective Data:    ECG 1/20/21 - NSR    11/12/2019- NSR, normal ecg    CT heart score 9/2019  Left main coronary artery: 0  Left anterior descending coronary artery: 40  Left circumflex coronary artery: 0  Right coronary artery: 0  Posterior descending coronary artery: 0     Total calcium score: 40       Echo 4/21/2021-normal LVEF, no significant valvular pathology      Event monitor- 3/31-4/29/21- 5 beat Vtach 4/6/21 at 0700.    05/04/21    ECHO STRESS 05/04/2021 5/25/2021    Interpretation Summary  · Baseline ECG: Normal sinus rhythm. · Stress test: Negative stress test. Low risk Duke treadmill score. · Echo: Negative stress echocardiogram for evidence of ischemia. Low risk duke treadmill score. No ecg changes at good functional capacity. Sensitivity of stress echocardiographic images is limited due to HR <85% of peak on post-exercise images.     Signed by: Miguel Drake DO on 5/4/2021  4:59 PM

## 2025-03-03 ENCOUNTER — TRANSCRIBE ORDERS (OUTPATIENT)
Facility: HOSPITAL | Age: 60
End: 2025-03-03

## 2025-03-03 DIAGNOSIS — S46.012A TRAUMATIC TEAR OF LEFT ROTATOR CUFF, UNSPECIFIED TEAR EXTENT, INITIAL ENCOUNTER: Primary | ICD-10-CM

## 2025-03-12 ENCOUNTER — HOSPITAL ENCOUNTER (OUTPATIENT)
Facility: HOSPITAL | Age: 60
Discharge: HOME | End: 2025-03-15
Payer: COMMERCIAL

## 2025-03-12 DIAGNOSIS — S46.012A TRAUMATIC TEAR OF LEFT ROTATOR CUFF, UNSPECIFIED TEAR EXTENT, INITIAL ENCOUNTER: ICD-10-CM

## 2025-03-12 PROCEDURE — 73221 MRI JOINT UPR EXTREM W/O DYE: CPT
